# Patient Record
(demographics unavailable — no encounter records)

---

## 2024-10-10 NOTE — ASSESSMENT
[FreeTextEntry1] : Referred to PT for neck and back For hands, try voltaren gel, can do CSIs if pain warrants, may need surgery in future The soft tissue lump in neck should be checked by ENT- not an orthopedic issue

## 2024-10-10 NOTE — HISTORY OF PRESENT ILLNESS
[Neck] : neck [Lower back] : lower back [Dull/Aching] : dull/aching [Localized] : localized [Tightness] : tightness [de-identified] : Has long h/o neck and back pain, b/l hand pain. Hasn't done anything for it in the past. No pain down arms, no numbness or tingling, no sciatica. Also found a lump in her neck she was told to check it out [] : no [FreeTextEntry5] : neck and lower back pain that developed a few yrs ago. States that she saw another ortho dr a few yrs ago. States that she is consistently physically active

## 2024-10-10 NOTE — IMAGING
[Facet arthropathy] : Facet arthropathy [Disc space narrowing] : Disc space narrowing [AP] : anteroposterior [There are no fractures, subluxations or dislocations. No significant abnormalities are seen] : There are no fractures, subluxations or dislocations. 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

## 2024-10-10 NOTE — PHYSICAL EXAM
[Extension] : extension [Rotation to left] : rotation to left [Rotation to right] : rotation to right [FreeTextEntry8] : 1cm soft tissue mass anterior cervical triangle- likely lymph node [Bilateral] : hand bilaterally [CMC Joint] : CMC joint [] : positive thumb basal grind

## 2025-01-17 NOTE — HISTORY OF PRESENT ILLNESS
[Neck] : neck [Lower back] : lower back [Dull/Aching] : dull/aching [Localized] : localized [Tightness] : tightness [de-identified] : 01/17/2025 Started some PT for neck/back/hands, would like to continue  Has long h/o neck and back pain, b/l hand pain. Hasn't done anything for it in the past. No pain down arms, no numbness or tingling, no sciatica. Also found a lump in her neck she was told to check it out [] : no [FreeTextEntry5] : neck and lower back pain that developed a few yrs ago. States that she saw another ortho dr a few yrs ago. States that she is consistently physically active